# Patient Record
Sex: FEMALE | Race: WHITE | Employment: FULL TIME | URBAN - METROPOLITAN AREA
[De-identification: names, ages, dates, MRNs, and addresses within clinical notes are randomized per-mention and may not be internally consistent; named-entity substitution may affect disease eponyms.]

---

## 2017-07-07 PROBLEM — F33.1 MODERATE EPISODE OF RECURRENT MAJOR DEPRESSIVE DISORDER (HCC): Status: ACTIVE | Noted: 2017-07-07

## 2017-07-07 PROBLEM — B00.9 HERPES SIMPLEX: Status: ACTIVE | Noted: 2017-07-07

## 2017-07-07 PROBLEM — F50.81 BINGE EATING DISORDER: Status: ACTIVE | Noted: 2017-07-07

## 2017-07-07 PROBLEM — R03.0 ELEVATED BP WITHOUT DIAGNOSIS OF HYPERTENSION: Status: ACTIVE | Noted: 2017-07-07

## 2017-08-10 PROBLEM — E11.9 TYPE 2 DIABETES MELLITUS WITHOUT COMPLICATION, WITHOUT LONG-TERM CURRENT USE OF INSULIN (HCC): Status: ACTIVE | Noted: 2017-08-10

## 2017-12-01 PROBLEM — N94.3 PREMENSTRUAL TENSION: Status: ACTIVE | Noted: 2017-12-01

## 2018-04-05 PROBLEM — E78.00 HIGH CHOLESTEROL: Status: ACTIVE | Noted: 2018-04-05

## 2018-07-03 ENCOUNTER — HOSPITAL ENCOUNTER (OUTPATIENT)
Dept: MAMMOGRAPHY | Age: 33
Discharge: HOME | End: 2018-07-03
Attending: FAMILY MEDICINE
Payer: COMMERCIAL

## 2018-07-03 DIAGNOSIS — L53.9 ERYTHEMA OF BREAST: ICD-10-CM

## 2018-07-03 DIAGNOSIS — N60.19 FIBROCYSTIC BREAST DISEASE (FCBD), UNSPECIFIED LATERALITY: ICD-10-CM

## 2018-07-03 PROCEDURE — 77066 DX MAMMO INCL CAD BI: CPT

## 2018-07-03 PROCEDURE — 76642 ULTRASOUND BREAST LIMITED: CPT

## 2019-04-04 PROBLEM — I10 HYPERTENSION, ESSENTIAL, BENIGN: Status: ACTIVE | Noted: 2017-07-07

## 2020-04-24 ENCOUNTER — HOSPITAL ENCOUNTER (OUTPATIENT)
Dept: GENERAL RADIOLOGY | Age: 35
Discharge: HOME | End: 2020-04-24
Payer: COMMERCIAL

## 2020-04-24 DIAGNOSIS — R05.9 COUGH: ICD-10-CM

## 2020-04-24 DIAGNOSIS — M54.6 CHRONIC BILATERAL THORACIC BACK PAIN: ICD-10-CM

## 2020-04-24 DIAGNOSIS — G89.29 CHRONIC BILATERAL THORACIC BACK PAIN: ICD-10-CM

## 2020-04-24 DIAGNOSIS — R06.00 DYSPNEA, UNSPECIFIED TYPE: ICD-10-CM

## 2020-04-24 PROCEDURE — 71046 X-RAY EXAM CHEST 2 VIEWS: CPT

## 2020-04-24 NOTE — PROGRESS NOTES
Let her know that her chest x-ray was entirely normal and get a copy of it to her if necessary. However if she is still having problems it would be best if we made arrangements for her to be seen at 1 of the consolidated clinics.

## 2020-04-30 ENCOUNTER — HOSPITAL ENCOUNTER (OUTPATIENT)
Dept: LAB | Age: 35
Discharge: HOME | End: 2020-04-30
Payer: COMMERCIAL

## 2020-04-30 ENCOUNTER — HOSPITAL ENCOUNTER (OUTPATIENT)
Dept: GENERAL RADIOLOGY | Age: 35
Discharge: HOME | End: 2020-04-30
Payer: COMMERCIAL

## 2020-04-30 DIAGNOSIS — M54.50 ACUTE RIGHT-SIDED LOW BACK PAIN WITHOUT SCIATICA: ICD-10-CM

## 2020-04-30 LAB
BNP SERPL-MCNC: 13 PG/ML (ref 5–125)
D DIMER PPP FEU-MCNC: 0.4 UG/ML(FEU)

## 2020-04-30 PROCEDURE — 83880 ASSAY OF NATRIURETIC PEPTIDE: CPT

## 2020-04-30 PROCEDURE — 85379 FIBRIN DEGRADATION QUANT: CPT

## 2020-04-30 PROCEDURE — 36415 COLL VENOUS BLD VENIPUNCTURE: CPT

## 2020-04-30 PROCEDURE — 72100 X-RAY EXAM L-S SPINE 2/3 VWS: CPT

## 2020-04-30 NOTE — PROGRESS NOTES
Please let patient know that back film and lab work were normal.  Therefore,  I am trying to reach her contact at work to see what they need for her to be able to return to work. We will keep her posted. Please have her call them on her end as well.    Thanks